# Patient Record
Sex: MALE | Race: WHITE | URBAN - METROPOLITAN AREA
[De-identification: names, ages, dates, MRNs, and addresses within clinical notes are randomized per-mention and may not be internally consistent; named-entity substitution may affect disease eponyms.]

---

## 2017-06-08 ENCOUNTER — HOSPITAL ENCOUNTER (OUTPATIENT)
Dept: CT IMAGING | Facility: CLINIC | Age: 72
Discharge: HOME | End: 2017-06-08
Attending: PREVENTIVE MEDICINE | Admitting: PREVENTIVE MEDICINE

## 2017-06-08 DIAGNOSIS — Z00.6 RESEARCH EXAM: ICD-10-CM

## 2017-06-08 PROCEDURE — 71250 CT THORAX DX C-: CPT | Mod: TC

## 2019-05-20 ENCOUNTER — RESULTS ONLY (OUTPATIENT)
Dept: OTHER | Facility: CLINIC | Age: 74
End: 2019-05-20

## 2019-05-20 ENCOUNTER — ALLIED HEALTH/NURSE VISIT (OUTPATIENT)
Dept: TRANSPLANT | Facility: CLINIC | Age: 74
End: 2019-05-20
Attending: INTERNAL MEDICINE

## 2019-05-20 ENCOUNTER — RESULTS ONLY (OUTPATIENT)
Dept: LAB | Age: 74
End: 2019-05-20

## 2019-05-20 DIAGNOSIS — Z00.6 RESEARCH STUDY PATIENT: Primary | ICD-10-CM

## 2019-05-20 PROCEDURE — 36415 COLL VENOUS BLD VENIPUNCTURE: CPT

## 2019-05-20 NOTE — NURSING NOTE
Chief Complaint   Patient presents with     Blood Draw     Labs drawn via VPT by RN in lab.      Labs collected from venipuncture by RN.     Evelina ISAACS RN PHN BSN  BMT/Oncology Lab

## 2019-05-23 LAB
A* LOCUS: NORMAL
A*: NORMAL
ABTEST METHOD: NORMAL
B* LOCUS: NORMAL
B*: NORMAL
BW-1: NORMAL
BW-2: NORMAL
C* LOCUS: NORMAL
C*: NORMAL

## 2019-05-30 LAB — CMV IGG SERPL QL IA: >8 AI (ref 0–0.8)

## 2021-05-30 ENCOUNTER — RECORDS - HEALTHEAST (OUTPATIENT)
Dept: ADMINISTRATIVE | Facility: CLINIC | Age: 76
End: 2021-05-30